# Patient Record
Sex: FEMALE | NOT HISPANIC OR LATINO | Employment: OTHER
[De-identification: names, ages, dates, MRNs, and addresses within clinical notes are randomized per-mention and may not be internally consistent; named-entity substitution may affect disease eponyms.]

---

## 2023-09-19 ENCOUNTER — TRANSCRIBE ORDERS (OUTPATIENT)
Dept: OTHER | Age: 83
End: 2023-09-19

## 2023-09-19 DIAGNOSIS — M19.012 OSTEOARTHRITIS OF LEFT GLENOHUMERAL JOINT: Primary | ICD-10-CM

## 2023-09-28 ENCOUNTER — THERAPY VISIT (OUTPATIENT)
Dept: PHYSICAL THERAPY | Facility: CLINIC | Age: 83
End: 2023-09-28
Attending: ORTHOPAEDIC SURGERY
Payer: COMMERCIAL

## 2023-09-28 DIAGNOSIS — M25.512 LEFT SHOULDER PAIN, UNSPECIFIED CHRONICITY: ICD-10-CM

## 2023-09-28 DIAGNOSIS — M19.012 OSTEOARTHRITIS OF LEFT GLENOHUMERAL JOINT: Primary | ICD-10-CM

## 2023-09-28 PROCEDURE — 97110 THERAPEUTIC EXERCISES: CPT | Mod: GP | Performed by: PHYSICAL THERAPIST

## 2023-09-28 PROCEDURE — 97112 NEUROMUSCULAR REEDUCATION: CPT | Mod: GP | Performed by: PHYSICAL THERAPIST

## 2023-09-28 PROCEDURE — 97161 PT EVAL LOW COMPLEX 20 MIN: CPT | Mod: GP | Performed by: PHYSICAL THERAPIST

## 2023-09-28 NOTE — PROGRESS NOTES
PHYSICAL THERAPY EVALUATION  Type of Visit: Evaluation    See electronic medical record for Abuse and Falls Screening details.    Subjective       Presenting condition or subjective complaint: Patient reports chronic left shoulder pain related to arthritis for several years. She c/o limited motion and pain in the shoulder with reaching movements especially overhead and behind the back.  Date of onset: 23 (MD order for PT)    Relevant medical history: Arthritis; High blood pressure; Menopause; Osteoarthritis; Thyroid problems   Dates & types of surgery: R shoulder     Prior diagnostic imaging/testing results: X-ray arthritis   Prior therapy history for the same diagnosis, illness or injury: No      Prior Level of Function  Transfers:   Ambulation:   ADL:   IADL:     Living Environment  Social support: With family members   Type of home: [facility]   Stairs to enter the home: Yes   Is there a railing: Yes   Ramp: No   Stairs inside the home: Yes   Is there a railing: Yes   Help at home:    Equipment owned:       Employment: No retired  Hobbies/Interests:      Patient goals for therapy: increase the motion    Pain assessment: Location: shoulder joint/Ratin     Objective   SHOULDER EVALUATION  PAIN: Pain Level at Rest: 1/10  Pain Location: shoulder  Pain Quality: Aching  Pain Frequency: intermittent  Pain is Worst: daytime  Pain is Exacerbated By: reaching overhead, behind the back  Pain is Relieved By: rest  Pain Progression: Unchanged  INTEGUMENTARY (edema, incisions):   POSTURE: Sitting Posture: Rounded shoulders, Forward head, slouched  GAIT:   Weightbearing Status:   Assistive Device(s):   Gait Deviations:   BALANCE/PROPRIOCEPTION:   WEIGHTBEARING ALIGNMENT:   ROM:   (Degrees) Left AROM Left PROM Right AROM  Right PROM   Shoulder Flexion 88+ 102+ 140 148   Shoulder Extension 30  45    Shoulder Abduction 86+ 100+ 138 145   Shoulder Adduction       Shoulder Internal Rotation IR/EXT hand to lateral buttock  + 50+ IR/EXT hand to L 2-3 70   Shoulder External Rotation 20+ 25+ 65 70   Shoulder Horizontal Abduction       Shoulder Horizontal Adduction       Shoulder Flexion ER       Shoulder Flexion IR       Elbow Extension       Elbow Flexion       Pain:   End feel:     STRENGTH:  L shld  MMT : FLX 3+/5 +, ABD 3+/5 +, ER 4-/5, IR 5-/5   R shld : FLX 4+/5, ABD 4+5, ER 5-/5, IR 5/5  FLEXIBILITY:   SPECIAL TESTS:   PALPATION:  point tender, muscle tension B UT/levator  JOINT MOBILITY:  hypomobility L inferior, posterior and anterior GHJ  glides  CERVICAL SCREEN: CROM:  mod loss B rotation, B SB, EXT.    Assessment & Plan   CLINICAL IMPRESSIONS  Medical Diagnosis: osteoarthritis of L glenohumeral joint    Treatment Diagnosis: L shoulder pain, OA   Impression/Assessment: Patient is a 83 year old female with L shoulder arthritis complaints.  The following significant findings have been identified: Pain, Decreased ROM/flexibility, Decreased joint mobility, Decreased strength, Impaired muscle performance, and Impaired posture. These impairments interfere with their ability to perform self care tasks and household chores as compared to previous level of function.     Clinical Decision Making (Complexity):  Clinical Presentation: Stable/Uncomplicated  Clinical Presentation Rationale: based on medical and personal factors listed in PT evaluation  Clinical Decision Making (Complexity): Low complexity    PLAN OF CARE  Treatment Interventions:  Interventions: Manual Therapy, Neuromuscular Re-education, Therapeutic Activity, Therapeutic Exercise    Long Term Goals     PT Goal 1  Goal Identifier: reaching  Goal Description: Increase L shld ROM to ,  , ER 50, IR/EXT hand to sacrum to allow reaching overhead and behind the back.  Rationale: to maximize safety and independence with performance of ADLs and functional tasks;to maximize safety and independence with self cares  Target Date: 12/26/23      Frequency of Treatment: 2x  month  Duration of Treatment: 3 months    Recommended Referrals to Other Professionals: Physical Therapy  Education Assessment:   Learner/Method: Patient;Listening;Pictures/Video    Risks and benefits of evaluation/treatment have been explained.   Patient/Family/caregiver agrees with Plan of Care.     Evaluation Time:     PT Eval, Low Complexity Minutes (69195): 15       Signing Clinician: Yesenia Castaneda PT      [facility]                                                                                   OUTPATIENT PHYSICAL THERAPY      PLAN OF TREATMENT FOR OUTPATIENT REHABILITATION   Patient's Last Name, First Name, FRANCINEMonicaBUCKMonica AndradeМарина  PARTH YOB: 1940   Provider's Name   [facility]   Medical Record No.  [id number]     Onset Date: 09/19/23 (MD order for PT)  Start of Care Date: 09/28/23     Medical Diagnosis:  osteoarthritis of L glenohumeral joint      PT Treatment Diagnosis:  L shoulder pain, OA Plan of Treatment  Frequency/Duration: 2x month/ 3 months    Certification date from 09/28/23 to 12/26/23         See note for plan of treatment details and functional goals     Yesenia Castaneda PT                         I CERTIFY THE NEED FOR THESE SERVICES FURNISHED UNDER        THIS PLAN OF TREATMENT AND WHILE UNDER MY CARE .             Physician Signature               Date    X_____________________________________________________                    Referring Provider:  Paddy Dos Santos      Initial Assessment  See Epic Evaluation- Start of Care Date: 09/28/23

## 2023-10-05 ENCOUNTER — THERAPY VISIT (OUTPATIENT)
Dept: PHYSICAL THERAPY | Facility: CLINIC | Age: 83
End: 2023-10-05
Attending: ORTHOPAEDIC SURGERY
Payer: COMMERCIAL

## 2023-10-05 DIAGNOSIS — M19.012 OSTEOARTHRITIS OF LEFT GLENOHUMERAL JOINT: Primary | ICD-10-CM

## 2023-10-05 DIAGNOSIS — M25.512 LEFT SHOULDER PAIN, UNSPECIFIED CHRONICITY: ICD-10-CM

## 2023-10-05 PROCEDURE — 97110 THERAPEUTIC EXERCISES: CPT | Mod: GP | Performed by: PHYSICAL THERAPIST

## 2023-11-06 PROBLEM — M25.512 SHOULDER PAIN, LEFT: Status: RESOLVED | Noted: 2023-09-28 | Resolved: 2023-11-06

## 2023-11-06 PROBLEM — M19.012 OSTEOARTHRITIS OF LEFT GLENOHUMERAL JOINT: Status: RESOLVED | Noted: 2023-09-28 | Resolved: 2023-11-06

## 2023-11-06 NOTE — PROGRESS NOTES
"    DISCHARGE  Reason for Discharge: Patient has failed to schedule further appointments.    Equipment Issued:     Discharge Plan: Patient to continue home program.   10/05/23 0500   Appointment Info   Signing clinician's name / credentials Yohannes Kerr DPT   Total/Authorized Visits 6   Visits Used 2   Medical Diagnosis osteoarthritis of L glenohumeral joint   PT Tx Diagnosis L shoulder pain, OA   Precautions/Limitations 5' late 10/5   Quick Adds Certification   Progress Note/Certification   Start of Care Date 09/28/23   Onset of illness/injury or Date of Surgery 09/19/23  (MD order for PT)   Therapy Frequency 2x month   Predicted Duration 3 months   Certification date from 09/28/23   Certification date to 12/26/23   PT Goal 1   Goal Identifier reaching   Goal Description Increase L shld ROM to ,  , ER 50, IR/EXT hand to sacrum to allow reaching overhead and behind the back.   Rationale to maximize safety and independence with performance of ADLs and functional tasks;to maximize safety and independence with self cares   Goal Progress see AROM in objective section   Target Date 12/26/23   Subjective Report   Subjective Report Марина reports that the shoulder feels similar to last week's first visit. Has done the exercises \"somewhat\" since first session. Reaching overhead and to put on jacket both bother.   Objective Measures   Objective Measures Objective Measure 1   Objective Measure 1   Objective Measure L shoulder AROM   Details 96/87/base of neck/L buttock   Treatment Interventions (PT)   Interventions Therapeutic Procedure/Exercise;Neuromuscular Re-education;Manual Therapy   Therapeutic Procedure/Exercise   Therapeutic Procedures: strength, endurance, ROM, flexibillity minutes (94596) 28   PTRx Ther Proc 1 Cervical Retraction With Patient Overpressure   PTRx Ther Proc 1 - Details x10   PTRx Ther Proc 2 Thoracic Extension   PTRx Ther Proc 2 - Details x10 with pillowcase around neck   PTRx Ther " "Proc 3 Scapular Retraction/Depression   PTRx Ther Proc 3 - Details x 5   PTRx Ther Proc 4 Four Corner Stretch External Rotation at Side   PTRx Ther Proc 4 - Details 10 x 5\" cues for position   PTRx Ther Proc 5 Pendulum/Codmans   PTRx Ther Proc 5 - Details 2' with multiple cues   PTRx Ther Proc 6 Wand Shoulder Abduction Standing   PTRx Ther Proc 6 - Details x10   PTRx Ther Proc 7 Wand Shoulder Flexion in Standing   PTRx Ther Proc 7 - Details x10   Neuromuscular Re-education   PTRx Neuro Re-ed 1 Posture Correction with Lumbar Roll   PTRx Neuro Re-ed 1 - Details HEP   Education   Learner/Method Patient;Listening;Pictures/Video   Plan   Home program see PTRX   Updates to plan of care wand flexion/abduction, thoracic extension   Plan for next session MFR L UT, possible joint mobs, wand extension   Total Session Time   Timed Code Treatment Minutes 28   Total Treatment Time (sum of timed and untimed services) 28         Referring Provider:  Paddy Dos Santos    "

## 2023-11-15 ENCOUNTER — THERAPY VISIT (OUTPATIENT)
Dept: PHYSICAL THERAPY | Facility: CLINIC | Age: 83
End: 2023-11-15
Payer: COMMERCIAL

## 2023-11-15 DIAGNOSIS — M19.012 OSTEOARTHRITIS OF LEFT GLENOHUMERAL JOINT: Primary | ICD-10-CM

## 2023-11-15 PROCEDURE — 97110 THERAPEUTIC EXERCISES: CPT | Mod: GP | Performed by: PHYSICAL THERAPIST

## 2023-11-15 PROCEDURE — 97112 NEUROMUSCULAR REEDUCATION: CPT | Mod: GP | Performed by: PHYSICAL THERAPIST

## 2023-11-15 PROCEDURE — 97140 MANUAL THERAPY 1/> REGIONS: CPT | Mod: GP | Performed by: PHYSICAL THERAPIST

## 2023-11-27 ENCOUNTER — THERAPY VISIT (OUTPATIENT)
Dept: PHYSICAL THERAPY | Facility: CLINIC | Age: 83
End: 2023-11-27
Payer: COMMERCIAL

## 2023-11-27 DIAGNOSIS — M19.012 OSTEOARTHRITIS OF LEFT GLENOHUMERAL JOINT: Primary | ICD-10-CM

## 2023-11-27 PROCEDURE — 97110 THERAPEUTIC EXERCISES: CPT | Mod: GP | Performed by: PHYSICAL THERAPIST

## 2023-11-27 PROCEDURE — 97112 NEUROMUSCULAR REEDUCATION: CPT | Mod: GP | Performed by: PHYSICAL THERAPIST

## 2023-11-27 PROCEDURE — 97140 MANUAL THERAPY 1/> REGIONS: CPT | Mod: GP | Performed by: PHYSICAL THERAPIST

## 2023-12-13 ENCOUNTER — THERAPY VISIT (OUTPATIENT)
Dept: PHYSICAL THERAPY | Facility: CLINIC | Age: 83
End: 2023-12-13
Payer: COMMERCIAL

## 2023-12-13 DIAGNOSIS — M19.012 OSTEOARTHRITIS OF LEFT GLENOHUMERAL JOINT: Primary | ICD-10-CM

## 2023-12-13 PROCEDURE — 97140 MANUAL THERAPY 1/> REGIONS: CPT | Mod: GP | Performed by: PHYSICAL THERAPIST

## 2023-12-13 PROCEDURE — 97110 THERAPEUTIC EXERCISES: CPT | Mod: GP | Performed by: PHYSICAL THERAPIST

## 2023-12-13 NOTE — PROGRESS NOTES
DISCHARGE  Reason for Discharge: Patient chooses to discontinue therapy.  Pt has other health concerns to focus on at this time.     Equipment Issued:     Discharge Plan: Patient to continue home program.   12/13/23 0500   Appointment Info   Signing clinician's name / credentials Yesenia Castaneda PT   Total/Authorized Visits 6   Visits Used 5   Medical Diagnosis osteoarthritis of L glenohumeral joint   PT Tx Diagnosis L shoulder pain, OA   Quick Adds Certification   Progress Note/Certification   Start of Care Date 09/28/23   Onset of illness/injury or Date of Surgery 09/19/23  (MD order for PT)   Therapy Frequency 2x month   Predicted Duration 3 months   Certification date from 09/28/23   Certification date to 12/26/23   PT Goal 1   Goal Identifier reaching   Goal Description Increase L shld ROM to ,  , ER 50, IR/EXT hand to sacrum to allow reaching overhead and behind the back.   Rationale to maximize safety and independence with performance of ADLs and functional tasks;to maximize safety and independence with self cares   Goal Progress see AROM in objective section   Target Date 12/26/23   Subjective Report   Subjective Report Марина is stressed since she will have a CT on her heart- may need a heart valve replacement.  She has not been   doing the exercises often but the shoulder is feeling slightly better. She would like to continue the exercises at home.   Objective Measures   Objective Measures Objective Measure 1   Objective Measure 1   Objective Measure L shoulder AROM   Details Increased AROM L , . PROM supine- ER 45, IR/EXT hand to sacrum.   Treatment Interventions (PT)   Interventions Therapeutic Procedure/Exercise;Neuromuscular Re-education;Manual Therapy   Therapeutic Procedure/Exercise   Therapeutic Procedures: strength, endurance, ROM, flexibillity minutes (65358) 25   Therapeutic Procedures Ther Proc 8;Ther Proc 9   Ther Proc 8 wand EXT standing   Ther Proc 8 - Details x10  "  Ther Proc 9 wand IR   Ther Proc 9 - Details x10- cues for proper technique- avoid forward bending trunk   PTRx Ther Proc 1 Cervical Retraction With Patient Overpressure   PTRx Ther Proc 1 - Details HEP   PTRx Ther Proc 2 shld EXt in standing   PTRx Ther Proc 2 - Details x10 AG   PTRx Ther Proc 3 Scapular Retraction/Depression   PTRx Ther Proc 3 - Details HEP   PTRx Ther Proc 4 Four Corner Stretch External Rotation at Side   PTRx Ther Proc 4 - Details rev- 10 x 5\" cues for position   PTRx Ther Proc 5 Pendulum/Codmans   PTRx Ther Proc 5 - Details HEP   PTRx Ther Proc 6 Wand Shoulder Abduction Standing   PTRx Ther Proc 6 - Details x10   PTRx Ther Proc 7 Wand Shoulder Flexion in Standing   PTRx Ther Proc 7 - Details x10   Patient Response/Progress Increased AROM FLX, ABD   Neuromuscular Re-education   Neuromuscular Re-education Neuro Re-ed 2   Neuro Re-ed 2 rowing with RTB   Neuro Re-ed 2 - Details rev-10 reps 5\" hold- cues for scap dep/retraction   PTRx Neuro Re-ed 1 reviewed neutral siting posture- avoid FH rounded shld posture   Manual Therapy   Manual Therapy: Mobilization, MFR, MLD, friction massage minutes (56869) 13   Manual Therapy 1 L GHJ oscillations, L UT MFR   Manual Therapy 1 - Details GR II- supine   Patient Response/Progress anastacio well   Education   Learner/Method Patient;Listening;Pictures/Video   Plan   Home program see PTRX   Updates to plan of care shld EXT in standing   Plan for next session continue HEP- DC   Total Session Time   Timed Code Treatment Minutes 38   Total Treatment Time (sum of timed and untimed services) 38         Referring Provider:  Paddy Dos Santos   "